# Patient Record
Sex: FEMALE | Race: BLACK OR AFRICAN AMERICAN
[De-identification: names, ages, dates, MRNs, and addresses within clinical notes are randomized per-mention and may not be internally consistent; named-entity substitution may affect disease eponyms.]

---

## 2018-04-16 ENCOUNTER — HOSPITAL ENCOUNTER (EMERGENCY)
Dept: HOSPITAL 62 - ER | Age: 23
Discharge: HOME | End: 2018-04-16
Payer: COMMERCIAL

## 2018-04-16 VITALS — DIASTOLIC BLOOD PRESSURE: 70 MMHG | SYSTOLIC BLOOD PRESSURE: 119 MMHG

## 2018-04-16 DIAGNOSIS — Z88.6: ICD-10-CM

## 2018-04-16 DIAGNOSIS — B37.3: Primary | ICD-10-CM

## 2018-04-16 LAB
BACTERIA (WET MOUNT): (no result)
CHLAM PCR: DETECTED
GON PCR: NOT DETECTED
RBCS (WET MOUNT): (no result)
T.VAGINALIS (WET MOUNT): (no result)
WBCS (WET MOUNT): (no result)
YEAST (WET MOUNT): (no result)

## 2018-04-16 PROCEDURE — 87591 N.GONORRHOEAE DNA AMP PROB: CPT

## 2018-04-16 PROCEDURE — 99283 EMERGENCY DEPT VISIT LOW MDM: CPT

## 2018-04-16 PROCEDURE — 87491 CHLMYD TRACH DNA AMP PROBE: CPT

## 2018-04-16 PROCEDURE — 87210 SMEAR WET MOUNT SALINE/INK: CPT

## 2018-04-16 NOTE — ER DOCUMENT REPORT
ED GI/





- General


Chief Complaint: Vaginal Discharge


Stated Complaint: VAGINAL IRRITATION


Time Seen by Provider: 04/16/18 14:04


Mode of Arrival: Ambulatory


Information source: Patient


Notes: 


Patient is a 22-year-old female who presents to the emergency department with 

complaints of white vaginal discharge that started yesterday.  Patient 

describes this as "cottage cheese consistency" and states that it is itchy.  

Patient denies any abdominal pain, cramping, urinary symptoms such as dysuria, 

frequency or urgency.  Patient reports that 3 weeks ago she was diagnosed with 

bacterial vaginosis at the [facility] and states that she took an 

antibiotic that she cannot recall the name.  Patient denies any past medical or 

surgical history.  Patient reports use of EtOH occasionally but denies any 

tobacco or recreational drug use.


TRAVEL OUTSIDE OF THE [place] IN LAST 30 DAYS: No





- Related Data


Allergies/Adverse Reactions: 


 





codeine Allergy (Verified 04/16/18 13:33)


 


metronidazole [From Flagyl] Allergy (Verified 04/16/18 13:33)


 











Past Medical History





- General


Information source: Patient





- Social History


Smoking Status: Never Smoker


Family History: None





- Past Medical History


Cardiac Medical History: Reports: None


Pulmonary Medical History: Reports: None


EENT Medical History: Reports: None


Neurological Medical History: Reports: None


Endocrine Medical History: Reports: None


Renal/ Medical History: Reports: None


Malignancy Medical History: Reports: None


GI Medical History: Reports: None


Musculoskeltal Medical History: Reports None


Skin Medical History: Reports None


Psychiatric Medical History: Reports: None


Traumatic Medical History: Reports: None


Infectious Medical History: Reports: None


Surgical Hx: Negative





- Immunizations


Immunizations up to date: Yes


Hx Diphtheria, Pertussis, Tetanus Vaccination: Yes





Review of Systems





- Review of Systems


Constitutional: No symptoms reported


EENT: No symptoms reported


Cardiovascular: No symptoms reported


Respiratory: No symptoms reported


Gastrointestinal: No symptoms reported


Genitourinary: No symptoms reported


Female Genitourinary: See HPI


Musculoskeletal: No symptoms reported


Skin: No symptoms reported


Hematologic/Lymphatic: No symptoms reported


Neurological/Psychological: No symptoms reported





Physical Exam





- Vital signs


Vitals: 


 











Temp Pulse Resp BP Pulse Ox


 


 98.1 F   76   16   119/70   99 


 


 04/16/18 13:41  04/16/18 13:41  04/16/18 13:41  04/16/18 13:41  04/16/18 13:41














- Notes


Notes: 


PHYSICAL EXAMINATION:





GENERAL: Well-appearing, well-nourished and in no acute distress.





HEAD: Atraumatic, normocephalic.





EYES: Pupils equal round and reactive to light, extraocular movements intact, 

conjunctiva are normal.





ENT: Nares patent, oropharynx clear without exudates.  Moist mucous membranes.





NECK: Normal range of motion, supple without lymphadenopathy





LUNGS: Breath sounds clear to auscultation bilaterally and equal.  No wheezes 

rales or rhonchi.





HEART: Regular rate and rhythm without murmurs





ABDOMEN: Soft, nontender, nondistended abdomen.  No guarding, no rebound.  No 

masses appreciated.





Female : External genitalia normal with no rashes or lesions, no CMT 

appreciated.  Thick white discharge noted. No odor noted.





Musculoskeletal: Normal range of motion.





NEUROLOGICAL: Cranial nerves grossly intact.  Normal speech, normal gait.





PSYCH: Normal mood, normal affect.





SKIN: Warm, Dry, normal turgor, no rashes or lesions noted.





Course





- Re-evaluation


Re-evalutation: 


Vaginal speculum exam consistent with yeast infection.  Will treat patient with 

Diflucan now and sent with a prescription to take an additional tablet in 72 

hours if not better.  Will also prescribe prescription for clindamycin as 

patient states that she always develops a bacterial vaginosis after treatment 

for yeast infection.  Patient states that she is allergic to Flagyl.  Explained 

to patient that clindamycin prescription should only be filled if she develops 

her typical symptoms of BV such as thin vaginal discharge with a foul odor.  

Close follow-up with the health department.


04/16/18 15:09








- Vital Signs


Vital signs: 


 











Temp Pulse Resp BP Pulse Ox


 


 98.1 F   76   16   119/70   99 


 


 04/16/18 13:41  04/16/18 13:41  04/16/18 13:41  04/16/18 13:41  04/16/18 13:41














Discharge





- Discharge


Clinical Impression: 


 Candidiasis, vagina





Condition: Stable


Disposition: HOME, SELF-CARE


Instructions:  Vaginal Yeast Infection ([facility])


Additional Instructions: 


You have been given a dose of Diflucan today in the emergency department.  Take 

a second dose of this medication in 72 hours if your symptoms have not 

resolved.  A prescription for clindamycin is being provided to you as you have 

stated that you typically develop bacterial vaginosis following treatment for 

yeast infections.  Do not fill this medication unless you develop symptoms of 

bacteria vaginosis.  Please follow-up with the health department in the next 3-

5 days for a recheck.


Prescriptions: 


Clindamycin HCl 300 mg PO BID #14 capsule


Fluconazole [Diflucan] 150 mg PO ONCE PRN #1 tablet


 PRN Reason: 


Forms:  Return to Work


Referrals: 


HEALTH DEPT,[facility] [NO LOCAL MD] - Follow up as needed